# Patient Record
Sex: MALE | Race: WHITE | NOT HISPANIC OR LATINO | Employment: UNEMPLOYED | URBAN - METROPOLITAN AREA
[De-identification: names, ages, dates, MRNs, and addresses within clinical notes are randomized per-mention and may not be internally consistent; named-entity substitution may affect disease eponyms.]

---

## 2022-01-20 PROCEDURE — U0004 COV-19 TEST NON-CDC HGH THRU: HCPCS | Performed by: NURSE PRACTITIONER

## 2022-03-29 ENCOUNTER — TRANSCRIBE ORDERS (OUTPATIENT)
Dept: ADMINISTRATIVE | Facility: HOSPITAL | Age: 32
End: 2022-03-29

## 2022-03-29 DIAGNOSIS — N62 GYNECOMASTIA: Primary | ICD-10-CM

## 2022-04-05 ENCOUNTER — TRANSCRIBE ORDERS (OUTPATIENT)
Dept: ADMINISTRATIVE | Facility: HOSPITAL | Age: 32
End: 2022-04-05

## 2022-04-05 DIAGNOSIS — N62 GYNECOMASTIA: Primary | ICD-10-CM

## 2022-04-08 ENCOUNTER — OFFICE VISIT (OUTPATIENT)
Dept: OTOLARYNGOLOGY | Facility: CLINIC | Age: 32
End: 2022-04-08

## 2022-04-08 VITALS — BODY MASS INDEX: 27.49 KG/M2 | HEIGHT: 74 IN | WEIGHT: 214.2 LBS | TEMPERATURE: 97.8 F

## 2022-04-08 DIAGNOSIS — Z87.891 HISTORY OF TOBACCO USE: Primary | ICD-10-CM

## 2022-04-08 PROCEDURE — 99203 OFFICE O/P NEW LOW 30 MIN: CPT | Performed by: OTOLARYNGOLOGY

## 2022-04-08 RX ORDER — IBUPROFEN 800 MG/1
800 TABLET ORAL EVERY 6 HOURS PRN
COMMUNITY
Start: 2022-02-24 | End: 2022-11-19

## 2022-04-08 RX ORDER — PANTOPRAZOLE SODIUM 40 MG/1
40 TABLET, DELAYED RELEASE ORAL DAILY
COMMUNITY
Start: 2022-03-29 | End: 2022-11-19

## 2022-04-08 RX ORDER — HYDROCODONE BITARTRATE AND ACETAMINOPHEN 5; 325 MG/1; MG/1
1 TABLET ORAL EVERY 6 HOURS PRN
COMMUNITY
Start: 2022-02-24 | End: 2022-11-19

## 2022-04-08 RX ORDER — HYDROXYZINE HYDROCHLORIDE 25 MG/1
TABLET, FILM COATED ORAL
COMMUNITY
Start: 2022-03-29 | End: 2022-11-19

## 2022-04-08 RX ORDER — CHLORHEXIDINE GLUCONATE 0.12 MG/ML
RINSE ORAL
COMMUNITY
Start: 2022-01-19 | End: 2022-11-19

## 2022-04-08 NOTE — PROGRESS NOTES
Patient Name: Lionel Zayas   Visit Date: 04/08/2022   Patient ID: [id number]  Provider: Timmy Louie MD    Sex: male  Location: [facility]   YOB: 1990  Location Address: [address] [place] [address], [address] [place] [address],?[place]?[id number]    Primary Care Provider Samson Lacey MD  Location Phone: [phone]    Referring Provider: Samson Lacey MD        Chief Complaint  Mouth Lesions    History of Present Illness  Lionel Zayas is a 32 y.o. male who presents to [facility] today as a consult from Samson Lacey MD for evaluation of an oral lesion. He initially noticed a white spot on his uvula a few months ago.  He has since noticed areas on his tongue he is concerned about.  He is not experiencing any pain. Denies any neck masses, dysphagia, hoarseness, bleeding, night sweats, or weight loss.  He did quit smoking a few years ago and has briefly used smokeless tobacco in the past.     Past Medical History:   Diagnosis Date   • Dental disease 10/2021    Cavities/ broken tooth   • GERD (gastroesophageal reflux disease) 10/2021       Past Surgical History:   Procedure Laterality Date   • MULTIPLE TOOTH EXTRACTIONS           Current Outpatient Medications:   •  chlorhexidine (PERIDEX) 0.12 % solution, RINSE MOUTH WITH 15ml (1 capful) FOR 30 seconds IN THE MORNING AND IN THE EVENING AFTER toothbrushing. SPIT AFTER rinsing, DO not swallow, Disp: , Rfl:   •  hydrOXYzine (ATARAX) 25 MG tablet, TAKE ONE TABLET BY MOUTH AS NEEDED AT BEDTIME, Disp: , Rfl:   •  pantoprazole (PROTONIX) 40 MG EC tablet, Take 40 mg by mouth Daily., Disp: , Rfl:   •  HYDROcodone-acetaminophen (NORCO) 5-325 MG per tablet, Take 1 tablet by mouth Every 6 (Six) Hours As Needed. for pain, Disp: , Rfl:   •  ibuprofen (ADVIL,MOTRIN) 800 MG tablet, Take 800 mg by mouth Every 6 (Six) Hours As Needed. for pain, Disp: , Rfl:      No Known Allergies    Social  "History     Tobacco Use   • Smoking status: Never Smoker   • Smokeless tobacco: Former User     Quit date: 2014   Vaping Use   • Vaping Use: Never used   Substance Use Topics   • Alcohol use: Not Currently   • Drug use: Not Currently     Types: Marijuana     Comment: \"Not currently but did for years\"        Objective     Vital Signs:   Temp 97.8 °F (36.6 °C) (Temporal)   Ht 188 cm (74\")   Wt 97.2 kg (214 lb 3.2 oz)   BMI 27.50 kg/m²       Physical Exam    General: Well developed, well nourished patient of stated age in no acute distress. Voice is strong and clear.   Head: Normocephalic and atraumatic.  Face: No lesions.  Bilateral parotid and submandibular glands are unremarkable.  Stensen's and Warthin's ducts are productive of clear saliva bilaterally.  House-Brackmann I/VI     bilaterally.   muscles and temporomandibular joint nontender to palpation.  No TMJ crepitus.  Eyes: PERRLA, sclerae anicteric, no conjunctival injection. Extra ocular movements are intact and full. No nystagmus.   Ears: Auricles are normal in appearance. Bilateral external auditory canals are unremarkable. Bilateral tympanic membranes are clear and without effusion. Hearing normal to conversational voice.   Nose: External nose is normal in appearance. Bilateral nares are patent with normal appearing mucosa. Septum midline. Turbinates are unremarkable. No lesions.   Oral Cavity: Lips are normal in appearance. Oral mucosa is unremarkable. Gingiva is unremarkable. Partial dentition for age. Tongue is unremarkable with good movement. Prominent circumvallate papilla and filiform papilla posteriorly and laterally. Hard palate is unremarkable.   Oropharynx: Soft palate is unremarkable with full movement. Uvula is unremarkable aside from a small prominent minor salivary gland on the left base of the uvula. Bilateral tonsils are unremarkable. Posterior oropharynx is unremarkable.    Larynx and hypopharynx: Deferred secondary to gag " reflex.  Neck: Supple.  No mass.  Nontender to palpation.  Trachea midline. Thyroid normal size and without nodules to palpation.   Lymphatic: No lymphadenopathy upon palpation.  Respiratory: Clear to auscultation bilaterally, nonlabored respirations    Cardiovascular: RRR, no murmurs, rubs, or gallops,   Psychiatric: Appropriate affect, cooperative   Neurologic: Oriented x 3, strength symmetric in all extremities, Cranial Nerves II-XII are grossly intact to confrontation   Skin: Warm and dry. No rashes.    Procedures           Result Review :               Assessment and Plan    Diagnoses and all orders for this visit:    1. History of tobacco use (Primary)    Impressions and findings were discussed at great length.  Currently, he was reassured that there is no evidence of lesion on examination today.  We discussed that his tongue lesions are likely consistent with prominent circumvallate papilla and filiform papilla and that the small lesion at the base of the uvula on the left appears to be a prominent minor salivary gland.  Options for further evaluation management were discussed and he will continue with observation at home.  If he notices any changes he is to call to arrange follow-up.        Follow Up   Return if symptoms worsen or fail to improve.  Patient was given instructions and counseling regarding his condition or for health maintenance advice. Please see specific information pulled into the AVS if appropriate.

## 2022-05-03 ENCOUNTER — HOSPITAL ENCOUNTER (OUTPATIENT)
Dept: MAMMOGRAPHY | Facility: HOSPITAL | Age: 32
Discharge: HOME | End: 2022-05-03

## 2022-05-03 ENCOUNTER — HOSPITAL ENCOUNTER (OUTPATIENT)
Dept: ULTRASOUND IMAGING | Facility: HOSPITAL | Age: 32
Discharge: HOME | End: 2022-05-03

## 2022-05-03 DIAGNOSIS — N62 GYNECOMASTIA: ICD-10-CM

## 2022-05-03 PROCEDURE — 76642 ULTRASOUND BREAST LIMITED: CPT

## 2022-05-03 PROCEDURE — G0279 TOMOSYNTHESIS, MAMMO: HCPCS

## 2022-05-03 PROCEDURE — 77066 DX MAMMO INCL CAD BI: CPT

## 2022-05-20 PROBLEM — N62 GYNECOMASTIA: Status: ACTIVE | Noted: 2022-05-20

## 2022-05-23 ENCOUNTER — TELEPHONE (OUTPATIENT)
Dept: SURGERY | Facility: CLINIC | Age: 32
End: 2022-05-23

## 2022-05-23 RX ORDER — VALACYCLOVIR HYDROCHLORIDE 1 G/1
1000 TABLET, FILM COATED ORAL 2 TIMES DAILY
COMMUNITY
Start: 2022-04-29 | End: 2022-11-19

## 2022-05-23 NOTE — TELEPHONE ENCOUNTER
Caller: EDNA MATTHEWS 1990    Relationship: SELF     Best call back number: [phone]     What is the best time to reach you: ANYTIME     What was the call regarding:  PT CALLED TO CANCEL SAME DAY APPT 5/23 10:10 W/ NASIMA- PT IS RESCHEDULED FOR 6/9 10:40 W/ DR. DEL REAL - NO CALL BACK NEEDED     Do you require a callback: NO

## 2022-06-09 ENCOUNTER — OFFICE VISIT (OUTPATIENT)
Dept: SURGERY | Facility: CLINIC | Age: 32
End: 2022-06-09

## 2022-06-09 VITALS — RESPIRATION RATE: 15 BRPM | BODY MASS INDEX: 27.46 KG/M2 | WEIGHT: 214 LBS | HEIGHT: 74 IN

## 2022-06-09 DIAGNOSIS — N62 GYNECOMASTIA: Primary | ICD-10-CM

## 2022-06-09 PROCEDURE — 99203 OFFICE O/P NEW LOW 30 MIN: CPT | Performed by: SURGERY

## 2022-06-09 RX ORDER — KETOCONAZOLE 20 MG/ML
SHAMPOO TOPICAL
COMMUNITY
Start: 2022-05-31 | End: 2022-11-19

## 2022-06-09 NOTE — PROGRESS NOTES
Chief Complaint: Breast Mass    Subjective         History of Present Illness  Lionel Zayas is a 32 y.o. male presents to [facility] to be seen for gynecomastia.  He has had this since puberty.  He has at least a c cup breast and this is painful for him and causes a lot of emotional distress.  His imaging is shown below:    Narrative & Impression   PROCEDURE:  MAMMO DIAGNOSTIC DIGITAL TOMOSYNTHESIS BILATERAL W CAD, 5/03/2022, 7:52  US BREAST LEFT LIMITED, 5/03/2022, 7:58     COMPARISON:  [facility], US, US BREAST LEFT LIMITED, 5/03/2022, 7:58.  [facility], US, US BREAST LEFT LIMITED, 9/11/2015, 14:11.  [facility], ,   DIGITAL DIAG KIERA W/CAD, 9/11/2015, 14:31.     INDICATIONS:  32-year-old male with request for workup for bilateral gynecomastia, which he states   he has had since age 19.  He was recommended for short-term follow-up of a hyperechoic left breast   mass in 2015. He reports having longstanding left breast lump and history of lipomas.     FINDINGS:          Mammogram:  There is prominent bilateral gynecomastia.  No suspicious mass, calcifications, or   architectural distortion is seen in either breast.     Ultrasound:  No sonographic abnormality is seen in the left breast at 02:00 at the site of   previously seen hyperechoic mass in 2015. Corresponding to the patient's left breast lump at 04:00,   10 cm from the nipple, there is a 2.4 x 0.9 x 1.9 cm oval, circumscribed, hyperechoic mass, likely   a lipoma.  Incidentally noted at 03:00, 10 cm from the nipple in the left breast is a similar   appearing 2.7 x 0.8 x 1.8 cm oval, circumscribed, hyperechoic mass, also likely a lipoma.     IMPRESSION:  Prominent bilateral gynecomastia.  Recommend clinical workup and clinical management.  Consider   surgical consultation.     Corresponding to the patient's left breast lump at 04:00, 10 cm from the nipple, there is a 2.4 cm    probable lipoma.  Similar appearing 2.7 cm probable lipoma is incidentally noted in the left breast   at 03:00, 10 cm from the nipple.  Recommend clinical follow-up.     I discussed results with the patient following the exam.     He has bilateral breast masses on imaging that are possibly lipomas.       Objective     Past Medical History:   Diagnosis Date   • Colon polyp     One polyp found during colonoscopy in    • Dental disease 10/2021    Cavities/ broken tooth   • GERD (gastroesophageal reflux disease) 10/2021       Past Surgical History:   Procedure Laterality Date   • MULTIPLE TOOTH EXTRACTIONS           Current Outpatient Medications:   •  chlorhexidine (PERIDEX) 0.12 % solution, RINSE MOUTH WITH 15ml (1 capful) FOR 30 seconds IN THE MORNING AND IN THE EVENING AFTER toothbrushing. SPIT AFTER rinsing, DO not swallow, Disp: , Rfl:   •  hydrOXYzine (ATARAX) 25 MG tablet, TAKE ONE TABLET BY MOUTH AS NEEDED AT BEDTIME, Disp: , Rfl:   •  ketoconazole (NIZORAL) 2 % shampoo, apply EVERY DAY TO THE AFFECTED AREA ON THE body AND SCALP UNTIL CLEAR. let sit FOR 10-15 MINUTES BEFORE rinsing, Disp: , Rfl:   •  pantoprazole (PROTONIX) 40 MG EC tablet, Take 40 mg by mouth Daily., Disp: , Rfl:   •  HYDROcodone-acetaminophen (NORCO) 5-325 MG per tablet, Take 1 tablet by mouth Every 6 (Six) Hours As Needed. for pain, Disp: , Rfl:   •  ibuprofen (ADVIL,MOTRIN) 800 MG tablet, Take 800 mg by mouth Every 6 (Six) Hours As Needed. for pain, Disp: , Rfl:   •  valACYclovir (VALTREX) 1000 MG tablet, Take 1,000 mg by mouth 2 (Two) Times a Day., Disp: , Rfl:     No Known Allergies     History reviewed. No pertinent family history.    Social History     Socioeconomic History   • Marital status: Single   Tobacco Use   • Smoking status: Former Smoker     Packs/day: 1.00     Years: 10.00     Pack years: 10.00     Types: Cigars     Start date: 2010     Quit date: 2022     Years since quittin.3   • Smokeless tobacco:  "Former User     Quit date: 2014   Vaping Use   • Vaping Use: Never used   Substance and Sexual Activity   • Alcohol use: Not Currently   • Drug use: Not Currently     Types: Marijuana     Comment: \"Not currently but did for years\"   • Sexual activity: Yes     Partners: Female     Birth control/protection: Surgical       Vital Signs:   Resp 15   Ht 188 cm (74\")   Wt 97.1 kg (214 lb)   BMI 27.48 kg/m²    Review of Systems    Physical Exam  Vitals and nursing note reviewed.   Constitutional:       General: He is not in acute distress.     Appearance: Normal appearance. He is well-developed.   HENT:      Head: Normocephalic and atraumatic.   Eyes:      Extraocular Movements: Extraocular movements intact.      Pupils: Pupils are equal, round, and reactive to light.   Cardiovascular:      Pulses: Normal pulses.   Pulmonary:      Effort: Pulmonary effort is normal. No retractions.      Breath sounds: Normal air entry. No wheezing.   Abdominal:      General: There is no distension.      Palpations: Abdomen is soft.      Tenderness: There is no abdominal tenderness.      Hernia: No hernia is present.   Musculoskeletal:         General: No swelling or deformity.      Cervical back: Neck supple.   Skin:     General: Skin is warm and dry.      Findings: No erythema.      Comments: Bilateral breast masses and gynecomastia   Neurological:      General: No focal deficit present.      Mental Status: He is alert and oriented to person, place, and time.      Motor: Motor function is intact.   Psychiatric:         Mood and Affect: Mood normal.         Thought Content: Thought content normal.          Result Review :               Assessment and Plan    Diagnoses and all orders for this visit:    1. Gynecomastia (Primary)      Pt will call and let us know when he wants to schedule surgery... will discuss with him about referral to have plastics involved    Follow Up   Return for Next scheduled followup after surgery.  Patient was " given instructions and counseling regarding his condition or for health maintenance advice. Please see specific information pulled into the AVS if appropriate.         This document has been electronically signed by Sammi Pires MD  June 9, 2022 12:31 EDT

## 2022-06-28 ENCOUNTER — TELEPHONE (OUTPATIENT)
Dept: GASTROENTEROLOGY | Facility: CLINIC | Age: 32
End: 2022-06-28

## 2022-06-28 NOTE — TELEPHONE ENCOUNTER
Spoke to Mr. Zayas on the phone at 10:30am, he stated that he wasn't ready for the phone call. That he would call back to reschedule that his insurance was no longer valid and that he would call back after he got new insurance to make sure procedure was covered. Kelley

## 2022-11-19 ENCOUNTER — HOSPITAL ENCOUNTER (EMERGENCY)
Facility: HOSPITAL | Age: 32
Discharge: HOME | End: 2022-11-19
Attending: EMERGENCY MEDICINE | Admitting: EMERGENCY MEDICINE

## 2022-11-19 VITALS
OXYGEN SATURATION: 99 % | TEMPERATURE: 98.4 F | HEIGHT: 74 IN | SYSTOLIC BLOOD PRESSURE: 126 MMHG | HEART RATE: 88 BPM | BODY MASS INDEX: 28.24 KG/M2 | DIASTOLIC BLOOD PRESSURE: 82 MMHG | WEIGHT: 220.02 LBS | RESPIRATION RATE: 16 BRPM

## 2022-11-19 DIAGNOSIS — J02.9 PHARYNGITIS, UNSPECIFIED ETIOLOGY: Primary | ICD-10-CM

## 2022-11-19 DIAGNOSIS — K04.7 DENTAL ABSCESS: ICD-10-CM

## 2022-11-19 LAB
FLUAV AG NPH QL: NEGATIVE
FLUBV AG NPH QL IA: NEGATIVE
S PYO AG THROAT QL: NEGATIVE
SARS-COV-2 RNA PNL SPEC NAA+PROBE: NOT DETECTED

## 2022-11-19 PROCEDURE — 87804 INFLUENZA ASSAY W/OPTIC: CPT | Performed by: EMERGENCY MEDICINE

## 2022-11-19 PROCEDURE — U0004 COV-19 TEST NON-CDC HGH THRU: HCPCS | Performed by: EMERGENCY MEDICINE

## 2022-11-19 PROCEDURE — 87880 STREP A ASSAY W/OPTIC: CPT | Performed by: EMERGENCY MEDICINE

## 2022-11-19 PROCEDURE — 87081 CULTURE SCREEN ONLY: CPT | Performed by: EMERGENCY MEDICINE

## 2022-11-19 PROCEDURE — 99283 EMERGENCY DEPT VISIT LOW MDM: CPT

## 2022-11-19 PROCEDURE — C9803 HOPD COVID-19 SPEC COLLECT: HCPCS | Performed by: EMERGENCY MEDICINE

## 2022-11-19 RX ORDER — PENICILLIN V POTASSIUM 500 MG/1
500 TABLET ORAL 4 TIMES DAILY
Qty: 40 TABLET | Refills: 0 | Status: SHIPPED | OUTPATIENT
Start: 2022-11-19 | End: 2022-11-29

## 2022-11-19 NOTE — ED PROVIDER NOTES
Subjective   History of Present Illness      The patient is a 33 yo M, who presents to the ED c/o sore throat. Symptoms started approximately 10 days ago. Saw white spots that he was concerned could be tonsil stones.  Took mom's azithro. Feeling more swollen. Also had dental abscess right upper molar that drained. Continues to drain intermittently. Denies difficulty swallowing, difficulty breathing, muffled voice, drooling. No associated facial swelling, trismus, nausea, vomiting. Denies URI symptoms at this time.      Review of Systems   Constitutional: Negative for chills and fever.   HENT: Positive for dental problem and sore throat. Negative for congestion, drooling, facial swelling, sinus pressure, sinus pain, trouble swallowing and voice change.    Respiratory: Negative for cough and shortness of breath.    Cardiovascular: Negative for chest pain and palpitations.   Gastrointestinal: Negative for abdominal pain, diarrhea, nausea and vomiting.   Genitourinary: Negative for dysuria.   Neurological: Negative for headaches.   All other systems reviewed and are negative.      Past Medical History:   Diagnosis Date   • Colon polyp     One polyp found during colonoscopy in    • Dental disease 10/2021    Cavities/ broken tooth   • GERD (gastroesophageal reflux disease) 10/2021       No Known Allergies    Past Surgical History:   Procedure Laterality Date   • MULTIPLE TOOTH EXTRACTIONS         History reviewed. No pertinent family history.    Social History     Socioeconomic History   • Marital status: Single   Tobacco Use   • Smoking status: Former     Packs/day: 1.00     Years: 10.00     Pack years: 10.00     Types: Cigars, Cigarettes     Start date: 2010     Quit date: 2022     Years since quittin.8   • Smokeless tobacco: Former     Quit date:    Vaping Use   • Vaping Use: Never used   Substance and Sexual Activity   • Alcohol use: Not Currently   • Drug use: Not Currently     Types:  Marijuana     Comment: yesterday   • Sexual activity: Yes     Partners: Female     Birth control/protection: Surgical           Objective   Physical Exam  Vitals and nursing note reviewed.   Constitutional:       Appearance: Normal appearance. He is not ill-appearing or toxic-appearing.   HENT:      Head: Normocephalic.      Nose: Nose normal.      Mouth/Throat:      Mouth: Mucous membranes are moist.      Dentition: Abnormal dentition.      Pharynx: Uvula midline. Posterior oropharyngeal erythema present. No pharyngeal swelling, oropharyngeal exudate or uvula swelling.      Tonsils: No tonsillar exudate or tonsillar abscesses.      Comments: No facial swelling. No appreciable adjacent abscess. No submandibular brawny edema. No sublingual induration or pus on floor of mouth.   Uvula midline, no dysphonia or drooling  Eyes:      Conjunctiva/sclera: Conjunctivae normal.   Cardiovascular:      Rate and Rhythm: Normal rate and regular rhythm.   Pulmonary:      Effort: Pulmonary effort is normal.   Musculoskeletal:      Cervical back: Normal range of motion.   Skin:     General: Skin is warm and dry.   Neurological:      Mental Status: He is alert.         Procedures           ED Course                                           MDM     33 yo M presents to ED for sore throat x 10 days. Took azithromycin. Also has had a dental abscess that previously drained, but still intermittently drains. No red flag signs concerning for retropharyngeal abscess, peritonsillar abscess. Flu, strep negative. covid pending. Will cover dental abscess with PCN. No clinical concern for lorena's angina. Advised f/u with PCP. Return precautions discussed.     I have spoken with the patient. I have explained the patient´s condition, diagnoses and treatment plan based on the information available to me at this time. I have answered the pt questions and addressed any concerns. The patient has a good  understanding of the patient´s diagnosis,  condition, and treatment plan as can be expected at this point. The vital signs have been stable. The patient´s condition is stable and appropriate for discharge from the emergency department.      The patient will pursue further outpatient evaluation with the primary care physician or other designated or consulting physician as outlined in the discharge instructions. They are agreeable to this plan of care and follow-up instructions have been explained in detail. The patient has received these instructions in written format and have expressed an understanding of the discharge instructions. The patient is aware that any significant change in condition or worsening of symptoms should prompt an immediate return to this or the closest emergency department or call to 911.   Labs Reviewed   RAPID STREP A SCREEN - Normal   INFLUENZA ANTIGEN, RAPID - Normal   COVID-19,APTIMA PANTHER (RENE)BH ELLY/[facility] BEAN, NP/OP SWAB IN UTM/VTM/[facility] [employer]SPORT MEDIA,24 HR TAT - Normal    Narrative:     Fact sheet for providers: https://www.fda.gov/media/726311/download     Fact sheet for patients: https://www.fda.gov/media/685025/download    Test performed by RT PCR.   BETA HEMOLYTIC STREP CULTURE, THROAT - Normal    Narrative:     Group A Strep incidence is low in adults. Positive culture for Beta hemolytic Streptococcus species can reflect colonization and not true infection. Please correlate clinically.          Final diagnoses:   Pharyngitis, unspecified etiology   Dental abscess       ED Disposition  ED Disposition     ED Disposition   Discharge    Condition   Stable    Comment   --             Saira Blackwell, APRN  [address] [facility] [address]  [phone]          [facility] EMERGENCY ROOM  [address] [address] [phone]    If symptoms worsen         Medication List      New Prescriptions    penicillin v potassium 500 MG tablet  Commonly known as: VEETID  Take 1  tablet by mouth 4 (Four) Times a Day for 10 days.           Where to Get Your Medications      These medications were sent to [facility] [place], [place] - [address] - [phone]  - [phone]   [address] [facility] [address]    Phone: [phone]   · penicillin v potassium 500 MG tablet          Moris Currie PA-C  11/21/22 [id number]

## 2022-11-21 LAB — BACTERIA SPEC AEROBE CULT: NORMAL

## 2023-03-17 PROCEDURE — U0004 COV-19 TEST NON-CDC HGH THRU: HCPCS | Performed by: PHYSICIAN ASSISTANT

## 2023-04-03 ENCOUNTER — HOSPITAL ENCOUNTER (EMERGENCY)
Facility: HOSPITAL | Age: 33
Discharge: HOME | End: 2023-04-03
Attending: EMERGENCY MEDICINE | Admitting: EMERGENCY MEDICINE
Payer: MEDICAID

## 2023-04-03 VITALS
TEMPERATURE: 100.2 F | BODY MASS INDEX: 28.49 KG/M2 | HEIGHT: 74 IN | RESPIRATION RATE: 18 BRPM | HEART RATE: 105 BPM | DIASTOLIC BLOOD PRESSURE: 89 MMHG | WEIGHT: 222 LBS | OXYGEN SATURATION: 96 % | SYSTOLIC BLOOD PRESSURE: 129 MMHG

## 2023-04-03 DIAGNOSIS — J02.0 STREP PHARYNGITIS: Primary | ICD-10-CM

## 2023-04-03 DIAGNOSIS — R50.9 FEVER, UNSPECIFIED: ICD-10-CM

## 2023-04-03 LAB — S PYO AG THROAT QL: NEGATIVE

## 2023-04-03 PROCEDURE — 25010000002 DEXAMETHASONE SODIUM PHOSPHATE 10 MG/ML SOLUTION: Performed by: NURSE PRACTITIONER

## 2023-04-03 PROCEDURE — 87081 CULTURE SCREEN ONLY: CPT | Performed by: EMERGENCY MEDICINE

## 2023-04-03 PROCEDURE — 25010000002 KETOROLAC TROMETHAMINE PER 15 MG: Performed by: NURSE PRACTITIONER

## 2023-04-03 PROCEDURE — 87880 STREP A ASSAY W/OPTIC: CPT | Performed by: EMERGENCY MEDICINE

## 2023-04-03 PROCEDURE — 99283 EMERGENCY DEPT VISIT LOW MDM: CPT

## 2023-04-03 PROCEDURE — 96372 THER/PROPH/DIAG INJ SC/IM: CPT

## 2023-04-03 RX ORDER — DEXAMETHASONE SODIUM PHOSPHATE 10 MG/ML
10 INJECTION, SOLUTION INTRAMUSCULAR; INTRAVENOUS ONCE
Status: COMPLETED | OUTPATIENT
Start: 2023-04-03 | End: 2023-04-03

## 2023-04-03 RX ORDER — KETOROLAC TROMETHAMINE 30 MG/ML
30 INJECTION, SOLUTION INTRAMUSCULAR; INTRAVENOUS ONCE
Status: COMPLETED | OUTPATIENT
Start: 2023-04-03 | End: 2023-04-03

## 2023-04-03 RX ORDER — AZITHROMYCIN 500 MG/1
500 TABLET, FILM COATED ORAL DAILY
Qty: 5 TABLET | Refills: 0 | Status: SHIPPED | OUTPATIENT
Start: 2023-04-03

## 2023-04-03 RX ADMIN — KETOROLAC TROMETHAMINE 30 MG: 30 INJECTION, SOLUTION INTRAMUSCULAR; INTRAVENOUS at 12:33

## 2023-04-03 RX ADMIN — DEXAMETHASONE SODIUM PHOSPHATE 10 MG: 10 INJECTION INTRAMUSCULAR; INTRAVENOUS at 12:33

## 2023-04-03 NOTE — Clinical Note
[facility] EMERGENCY ROOM  [address]  [place] [address]  Phone: [phone]    Lionel Zayas was seen and treated in our emergency department on 4/3/2023.  He may return to work on 04/06/2023.         Thank you for choosing [facility].    Sparkle Disla, LACHELLE

## 2023-04-03 NOTE — DISCHARGE INSTRUCTIONS
Even though your rapid strep test was negative today, I believe you do have strep.  I am going to go ahead and treat you for this.  Please make sure you take your antibiotics until they are gone.  You may want to take medication with food if it upsets your stomach.  Drink plenty of fluids and stay well-hydrated.  Alternate Tylenol and ibuprofen every 4-6 hours as needed for pain or fever.  Return to the ER with any new or worsening symptoms.

## 2023-04-03 NOTE — ED PROVIDER NOTES
Time: 12:20 PM EDT  Date of encounter:  4/3/2023  Independent Historian/Clinical History and Information was obtained by:   Patient  Chief Complaint: Sore throat    History is limited by: N/A    History of Present Illness:  Patient is a 33 y.o. year old male who presents to the emergency department for evaluation of sore throat since yesterday. Pt reports being able to feel tonsil on the side. Pt reports having a fever and has been taking OTC. Pt reports his kids having strep throat. Pt denies cough and congestion. Pt confirms ear pain and dysphagia. Pt reports slight nausea.    HPI    Patient Care Team  Primary Care Provider: Provider, No Known    Past Medical History:     No Known Allergies  Past Medical History:   Diagnosis Date   • Colon polyp     One polyp found during colonoscopy in    • Dental disease 10/2021    Cavities/ broken tooth   • GERD (gastroesophageal reflux disease) 10/2021     Past Surgical History:   Procedure Laterality Date   • MULTIPLE TOOTH EXTRACTIONS       History reviewed. No pertinent family history.    Home Medications:  Prior to Admission medications    Not on File        Social History:   Social History     Tobacco Use   • Smoking status: Former     Packs/day: 1.00     Years: 10.00     Pack years: 10.00     Types: Cigars, Cigarettes     Start date: 2010     Quit date: 2022     Years since quittin.1   • Smokeless tobacco: Former     Quit date:    Vaping Use   • Vaping Use: Never used   Substance Use Topics   • Alcohol use: Not Currently   • Drug use: Not Currently     Types: Marijuana     Comment: yesterday         Review of Systems:  Review of Systems   Constitutional: Positive for fever. Negative for chills and fatigue.   HENT: Positive for ear pain and sore throat. Negative for congestion and rhinorrhea.         Dysphagia   Eyes: Negative.    Respiratory: Negative for cough, chest tightness and shortness of breath.    Cardiovascular: Negative for chest pain  "and palpitations.   Gastrointestinal: Positive for nausea. Negative for abdominal pain, diarrhea and vomiting.   Endocrine: Negative.    Genitourinary: Negative for decreased urine volume, difficulty urinating, flank pain, frequency, hematuria and urgency.   Musculoskeletal: Negative for arthralgias and myalgias.   Skin: Negative for color change, rash and wound.   Allergic/Immunologic: Negative.    Neurological: Negative for dizziness, syncope, weakness, light-headedness and headaches.   Hematological: Negative.    Psychiatric/Behavioral: Negative for agitation and confusion. The patient is not nervous/anxious.         Physical Exam:  /89   Pulse 105   Temp 100.2 °F (37.9 °C) (Oral)   Resp 18   Ht 188 cm (74\")   Wt 101 kg (222 lb)   SpO2 96%   BMI 28.50 kg/m²     Physical Exam  Vitals and nursing note reviewed.   Constitutional:       General: He is not in acute distress.     Appearance: Normal appearance. He is not ill-appearing.   HENT:      Head: Normocephalic and atraumatic.      Comments: Diaphoretic forehead, posterior aryngal edema, erythema, tonsilar swelling 2-3+ bilaterally, exudate on tonsils on R, bilaterally cervical adenopathy, ears nl     Right Ear: Tympanic membrane and ear canal normal.      Left Ear: Tympanic membrane and ear canal normal.      Ears:      Comments: Normal exam     Nose: Nose normal.      Mouth/Throat:      Pharynx: Pharyngeal swelling and posterior oropharyngeal erythema present.      Tonsils: Tonsillar exudate (R side) present.      Comments: tonsillar swelling 2-3+ bilaterally, erythema, posterior  Eyes:      Extraocular Movements: Extraocular movements intact.      Pupils: Pupils are equal, round, and reactive to light.   Cardiovascular:      Rate and Rhythm: Normal rate and regular rhythm.      Pulses: Normal pulses.      Heart sounds: Normal heart sounds. No murmur heard.    No gallop.   Pulmonary:      Effort: Pulmonary effort is normal. No respiratory distress. "      Breath sounds: Normal breath sounds. No wheezing, rhonchi or rales.   Chest:      Chest wall: No tenderness.   Abdominal:      General: Bowel sounds are normal. There is no distension.      Palpations: Abdomen is soft.      Tenderness: There is no abdominal tenderness.   Musculoskeletal:         General: No tenderness. Normal range of motion.      Cervical back: Normal range of motion and neck supple.   Lymphadenopathy:      Cervical: Cervical adenopathy (bilateral anterior) present.   Skin:     General: Skin is warm and dry.      Findings: No erythema or rash.      Comments: Diaphoretic forehead   Neurological:      Mental Status: He is alert and oriented to person, place, and time.      Motor: No weakness.   Psychiatric:         Mood and Affect: Mood normal.         Behavior: Behavior normal.                  Procedures:  Procedures      Medical Decision Making:      Comorbidities that affect care:    GERD    External Notes reviewed:    Previous Clinic Note: patient seen at [facility] Mar 17 for complaints of pharyngitis      The following orders were placed and all results were independently analyzed by me:  Orders Placed This Encounter   Procedures   • Rapid Strep A Screen - Swab, Throat   • Beta Strep Culture, Throat - Swab, Throat       Medications Given in the Emergency Department:  Medications   ketorolac (TORADOL) injection 30 mg (30 mg Intramuscular Given 4/3/23 1233)   dexamethasone sodium phosphate injection 10 mg (10 mg Intramuscular Given 4/3/23 1233)        ED Course:         Labs:    Lab Results (last 24 hours)     ** No results found for the last 24 hours. **           Imaging:    No Radiology Exams Resulted Within Past 24 Hours      Differential Diagnosis and Discussion:    Sore Throat: Differential diagnosis includes but is not limited to bacterial infection, viral infection, inhaled irritants, sinus drainage, thyroiditis, epiglottitis, and retropharyngeal abscess.    All labs were reviewed and  interpreted by me.    MDM  Number of Diagnoses or Management Options  Fever, unspecified  Strep pharyngitis  Diagnosis management comments: Based on centor criteria, even though the rapid strep is negative, I'm still going to treat for strep pharyngitis. Patient appears moderately ill. Temp as noted above. Exudative pharyngo-tonsillitis is noted. Anterior cervical nodes are present.  Ears are normal, chest is clear.  Rapid strep test is negative. No rashes. No hepatosplenomegaly.       Amount and/or Complexity of Data Reviewed  Clinical lab tests: ordered and reviewed  Review and summarize past medical records: yes    Risk of Complications, Morbidity, and/or Mortality  Presenting problems: moderate  Diagnostic procedures: moderate  Management options: moderate    Patient Progress  Patient progress: stable           Patient Care Considerations:    NARCOTICS: I considered prescribing opiate pain medication as an outpatient, however patient was treated with NSAIDS and pain was tolerable. Narcotics not indicated at this time      Consultants/Shared Management Plan:    shared management with attending physician    Social Determinants of Health:    Patient is independent, reliable, and has access to care.       Disposition and Care Coordination:    Discharged: The patient is suitable and stable for discharge with no need for consideration of observation or admission.    I have explained discharge medications and the need for follow up with the patient/caretakers. This was also printed in the discharge instructions. Patient was discharged with the following medications and follow up:      Medication List      New Prescriptions    azithromycin 500 MG tablet  Commonly known as: ZITHROMAX  Take 1 tablet by mouth Daily.           Where to Get Your Medications      These medications were sent to [facility] - [place], [place] - [address] - [phone]  - [phone]  [address]  [address]    Phone: [phone]   · azithromycin 500 MG tablet      Provider, No Known  [facility]  [place] [address]  [phone]      find a PCP to follow up with if not better    [facility] EMERGENCY ROOM  [address] [address] [phone]  Go to   As needed, If symptoms worsen       Final diagnoses:   Strep pharyngitis   Fever, unspecified        ED Disposition     ED Disposition   Discharge    Condition   Stable    Comment   --             This medical record created using voice recognition software.        Documentation assistance provided by Joel Kothari acting as scribe for LACHELLE Bueno. Information recorded by the scribe was done at my direction and has been verified and validated by me.          Joel Kothari  04/03/23 1242       Sparkle Disla APRN  04/08/23 4468

## 2023-04-03 NOTE — Clinical Note
[facility] EMERGENCY ROOM  [address]  [place] [address]  Phone: [phone]    Lionel Zayas was seen and treated in our emergency department on 4/3/2023.  He may return to work on 04/06/2023.         Thank you for choosing [facility].    Sparkle Disla, LACHELLE

## 2023-04-05 LAB — BACTERIA SPEC AEROBE CULT: ABNORMAL

## 2023-05-15 ENCOUNTER — CLINICAL SUPPORT (OUTPATIENT)
Dept: GASTROENTEROLOGY | Facility: CLINIC | Age: 33
End: 2023-05-15
Payer: MEDICAID

## 2023-05-15 ENCOUNTER — PREP FOR SURGERY (OUTPATIENT)
Dept: OTHER | Facility: HOSPITAL | Age: 33
End: 2023-05-15
Payer: MEDICAID

## 2023-05-15 DIAGNOSIS — Z86.010 HISTORY OF COLON POLYPS: ICD-10-CM

## 2023-05-15 DIAGNOSIS — K21.9 GASTROESOPHAGEAL REFLUX DISEASE, UNSPECIFIED WHETHER ESOPHAGITIS PRESENT: Primary | ICD-10-CM

## 2023-05-15 DIAGNOSIS — Z12.11 ENCOUNTER FOR SCREENING FOR MALIGNANT NEOPLASM OF COLON: ICD-10-CM

## 2023-05-15 PROBLEM — Z86.0100 HISTORY OF COLON POLYPS: Status: ACTIVE | Noted: 2023-05-15

## 2023-05-15 RX ORDER — SODIUM PICOSULFATE, MAGNESIUM OXIDE, AND ANHYDROUS CITRIC ACID 12; 3.5; 1 G/175ML; G/175ML; MG/175ML
175 LIQUID ORAL TAKE AS DIRECTED
Qty: 350 ML | Refills: 0 | Status: SHIPPED | OUTPATIENT
Start: 2023-05-15

## 2023-05-15 NOTE — PROGRESS NOTES
Lionel Zayas  1990  33 y.o.    Reason for call: Recall- 5 yr recall, hx colon polyps/last colon 2015- [facility] GERD- had previous EGD in   Prep prescribed: Clenpiq  Prep instructions reviewed with patient and sent to patient via [employer]  Clearance needed? No  If yes, what clearance is needed? N/A  Clearance has been requested from N/A  The patient has been scheduled for: EGD & Colonoscopy  Family history of colon cancer? No  If yes, indicate relative: N/A  Family History   Problem Relation Age of Onset   • Colon cancer Neg Hx      Past Medical History:   Diagnosis Date   • Colon polyp     One polyp found during colonoscopy in    • Dental disease 10/2021    Cavities/ broken tooth   • GERD (gastroesophageal reflux disease) 10/2021     No Known Allergies  Past Surgical History:   Procedure Laterality Date   • COLONOSCOPY     • MULTIPLE TOOTH EXTRACTIONS       Social History     Socioeconomic History   • Marital status: Single   Tobacco Use   • Smoking status: Former     Packs/day: 1.00     Years: 10.00     Pack years: 10.00     Types: Cigars, Cigarettes     Start date: 2010     Quit date: 2022     Years since quittin.2     Passive exposure: Current   • Smokeless tobacco: Former     Quit date:    Vaping Use   • Vaping Use: Never used   Substance and Sexual Activity   • Alcohol use: Not Currently   • Drug use: Not Currently     Types: Marijuana     Comment: yesterday   • Sexual activity: Yes     Partners: Female     Birth control/protection: Surgical       Current Outpatient Medications:   •  azithromycin (ZITHROMAX) 500 MG tablet, Take 1 tablet by mouth Daily. (Patient not taking: Reported on 5/15/2023), Disp: 5 tablet, Rfl: 0

## 2023-08-09 ENCOUNTER — ANESTHESIA (OUTPATIENT)
Dept: GASTROENTEROLOGY | Facility: HOSPITAL | Age: 33
End: 2023-08-09
Payer: MEDICAID

## 2023-08-09 ENCOUNTER — ANESTHESIA EVENT (OUTPATIENT)
Dept: GASTROENTEROLOGY | Facility: HOSPITAL | Age: 33
End: 2023-08-09
Payer: MEDICAID

## 2023-08-09 ENCOUNTER — HOSPITAL ENCOUNTER (OUTPATIENT)
Facility: HOSPITAL | Age: 33
Setting detail: HOSPITAL OUTPATIENT SURGERY
Discharge: HOME | End: 2023-08-09
Attending: INTERNAL MEDICINE | Admitting: INTERNAL MEDICINE
Payer: MEDICAID

## 2023-08-09 VITALS
HEIGHT: 74 IN | TEMPERATURE: 97.6 F | SYSTOLIC BLOOD PRESSURE: 118 MMHG | OXYGEN SATURATION: 97 % | RESPIRATION RATE: 22 BRPM | HEART RATE: 68 BPM | DIASTOLIC BLOOD PRESSURE: 86 MMHG | WEIGHT: 218.26 LBS | BODY MASS INDEX: 28.01 KG/M2

## 2023-08-09 DIAGNOSIS — Z12.11 ENCOUNTER FOR SCREENING FOR MALIGNANT NEOPLASM OF COLON: ICD-10-CM

## 2023-08-09 DIAGNOSIS — Z86.010 HISTORY OF COLON POLYPS: ICD-10-CM

## 2023-08-09 DIAGNOSIS — K21.9 GASTROESOPHAGEAL REFLUX DISEASE, UNSPECIFIED WHETHER ESOPHAGITIS PRESENT: ICD-10-CM

## 2023-08-09 PROCEDURE — 45378 DIAGNOSTIC COLONOSCOPY: CPT | Performed by: INTERNAL MEDICINE

## 2023-08-09 PROCEDURE — 43239 EGD BIOPSY SINGLE/MULTIPLE: CPT | Performed by: INTERNAL MEDICINE

## 2023-08-09 PROCEDURE — 88305 TISSUE EXAM BY PATHOLOGIST: CPT | Performed by: INTERNAL MEDICINE

## 2023-08-09 PROCEDURE — 25010000002 PROPOFOL 10 MG/ML EMULSION: Performed by: NURSE ANESTHETIST, CERTIFIED REGISTERED

## 2023-08-09 RX ORDER — SODIUM CHLORIDE, SODIUM LACTATE, POTASSIUM CHLORIDE, CALCIUM CHLORIDE 600; 310; 30; 20 MG/100ML; MG/100ML; MG/100ML; MG/100ML
30 INJECTION, SOLUTION INTRAVENOUS CONTINUOUS
Status: DISCONTINUED | OUTPATIENT
Start: 2023-08-09 | End: 2023-08-09 | Stop reason: HOSPADM

## 2023-08-09 RX ORDER — SODIUM CHLORIDE, SODIUM LACTATE, POTASSIUM CHLORIDE, CALCIUM CHLORIDE 600; 310; 30; 20 MG/100ML; MG/100ML; MG/100ML; MG/100ML
30 INJECTION, SOLUTION INTRAVENOUS CONTINUOUS
Status: DISCONTINUED | OUTPATIENT
Start: 2023-08-09 | End: 2023-08-09

## 2023-08-09 RX ORDER — PROPOFOL 10 MG/ML
VIAL (ML) INTRAVENOUS AS NEEDED
Status: DISCONTINUED | OUTPATIENT
Start: 2023-08-09 | End: 2023-08-09 | Stop reason: SURG

## 2023-08-09 RX ORDER — LIDOCAINE HYDROCHLORIDE 20 MG/ML
INJECTION, SOLUTION EPIDURAL; INFILTRATION; INTRACAUDAL; PERINEURAL AS NEEDED
Status: DISCONTINUED | OUTPATIENT
Start: 2023-08-09 | End: 2023-08-09 | Stop reason: SURG

## 2023-08-09 RX ORDER — ESOMEPRAZOLE MAGNESIUM 40 MG/1
40 CAPSULE, DELAYED RELEASE ORAL DAILY
Qty: 90 CAPSULE | Refills: 3 | Status: SHIPPED | OUTPATIENT
Start: 2023-08-09

## 2023-08-09 RX ADMIN — SODIUM CHLORIDE, POTASSIUM CHLORIDE, SODIUM LACTATE AND CALCIUM CHLORIDE: 600; 310; 30; 20 INJECTION, SOLUTION INTRAVENOUS at 10:00

## 2023-08-09 RX ADMIN — SODIUM CHLORIDE, POTASSIUM CHLORIDE, SODIUM LACTATE AND CALCIUM CHLORIDE 30 ML/HR: 600; 310; 30; 20 INJECTION, SOLUTION INTRAVENOUS at 08:53

## 2023-08-09 RX ADMIN — LIDOCAINE HYDROCHLORIDE 40 MG: 20 INJECTION, SOLUTION EPIDURAL; INFILTRATION; INTRACAUDAL; PERINEURAL at 10:03

## 2023-08-09 RX ADMIN — PROPOFOL 100 MG: 10 INJECTION, EMULSION INTRAVENOUS at 10:03

## 2023-08-09 RX ADMIN — PROPOFOL 250 MCG/KG/MIN: 10 INJECTION, EMULSION INTRAVENOUS at 10:03

## 2023-08-09 NOTE — ANESTHESIA POSTPROCEDURE EVALUATION
Patient: Lionel Zayas    Procedure Summary       Date: 08/09/23 Room / Location: [facility] ENDOSCOPY 2 / [facility] ENDOSCOPY    Anesthesia Start: 1000 Anesthesia Stop: 1029    Procedures:       ESOPHAGOGASTRODUODENOSCOPY WITH BIOPSIES      COLONOSCOPY Diagnosis:       Gastroesophageal reflux disease, unspecified whether esophagitis present      Encounter for screening for malignant neoplasm of colon      History of colon polyps      (Gastroesophageal reflux disease, unspecified whether esophagitis present [K21.9])      (Encounter for screening for malignant neoplasm of colon [Z12.11])      (History of colon polyps [Z86.010])    Surgeons: Milton Hayward MD Provider: Marcio Blandon MD    Anesthesia Type: general ASA Status: 2            Anesthesia Type: general    Vitals  Vitals Value Taken Time   BP 89/55 08/09/23 1027   Temp     Pulse 85 08/09/23 1029   Resp     SpO2 98 % 08/09/23 1029   Vitals shown include unvalidated device data.        Post Anesthesia Care and Evaluation    Patient location during evaluation: bedside  Patient participation: complete - patient participated  Level of consciousness: awake  Pain management: adequate    Airway patency: patent  PONV Status: none  Cardiovascular status: acceptable and stable  Respiratory status: acceptable  Hydration status: acceptable    Comments: An Anesthesiologist personally participated in the most demanding procedures (including induction and emergence if applicable) in the anesthesia plan, monitored the course of anesthesia administration at frequent intervals and remained physically present and available for immediate diagnosis and treatment of emergencies.

## 2023-08-09 NOTE — H&P
"ScreeningPre Procedure History & Physical    Chief Complaint:   Screening   gerd    Subjective     HPI:   Screening     Past Medical History:   Past Medical History:   Diagnosis Date    Colon polyp 2015    One polyp found during colonoscopy in 2015    Dental disease 10/2021    Cavities/ broken tooth    GERD (gastroesophageal reflux disease) 10/2021       Past Surgical History:  Past Surgical History:   Procedure Laterality Date    COLONOSCOPY      MULTIPLE TOOTH EXTRACTIONS         Family History:  Family History   Problem Relation Age of Onset    Colon cancer Neg Hx        Social History:   reports that he quit smoking about 18 months ago. His smoking use included cigars and cigarettes. He started smoking about 13 years ago. He has a 10.00 pack-year smoking history. He has been exposed to tobacco smoke. He quit smokeless tobacco use about 9 years ago. He reports that he does not currently use alcohol. He reports current drug use. Drug: Marijuana.    Medications:   No medications prior to admission.       Allergies:  Patient has no known allergies.        Objective     Blood pressure 126/86, pulse 83, temperature 97.9 øF (36.6 øC), temperature source Temporal, resp. rate 18, height 188 cm (74\"), weight 99 kg (218 lb 4.1 oz), SpO2 95 %.    Physical Exam   Constitutional: Pt is oriented to person, place, and time and well-developed, well-nourished, and in no distress.   Mouth/Throat: Oropharynx is clear and moist.   Neck: Normal range of motion.   Cardiovascular: Normal rate, regular rhythm and normal heart sounds.    Pulmonary/Chest: Effort normal and breath sounds normal.   Abdominal: Soft. Nontender  Skin: Skin is warm and dry.   Psychiatric: Mood, memory, affect and judgment normal.     Assessment & Plan     Diagnosis:  Screening colonoscopy  H/o colon polyps   ged    Anticipated Surgical Procedure:  colonoscopy    Egd    The risks, benefits, and alternatives of this procedure have been discussed with the patient " or the responsible party- the patient understands and agrees to proceed.

## 2023-08-09 NOTE — ANESTHESIA PREPROCEDURE EVALUATION
Anesthesia Evaluation     Patient summary reviewed and Nursing notes reviewed   no history of anesthetic complications:   NPO Solid Status: > 8 hours  NPO Liquid Status: > 2 hours           Airway   Mallampati: I  TM distance: >3 FB  Neck ROM: full  No difficulty expected  Dental      Pulmonary - negative pulmonary ROS and normal exam    breath sounds clear to auscultation  Cardiovascular - negative cardio ROS and normal exam  Exercise tolerance: good (4-7 METS)    Rhythm: regular  Rate: normal        Neuro/Psych- negative ROS  GI/Hepatic/Renal/Endo    (+) GERD    Musculoskeletal (-) negative ROS    Abdominal    Substance History - negative use     OB/GYN negative ob/gyn ROS         Other - negative ROS       ROS/Med Hx Other: PAT Nursing Notes unavailable.                   Anesthesia Plan    ASA 2     general     (Total IV Anesthesia    Patient understands anesthesia not responsible for dental damage.  )  intravenous induction     Anesthetic plan, risks, benefits, and alternatives have been provided, discussed and informed consent has been obtained with: patient.    Plan discussed with CRNA.      CODE STATUS:

## 2023-08-10 LAB
CYTO UR: NORMAL
LAB AP CASE REPORT: NORMAL
LAB AP CLINICAL INFORMATION: NORMAL
PATH REPORT.FINAL DX SPEC: NORMAL
PATH REPORT.GROSS SPEC: NORMAL

## 2024-10-24 ENCOUNTER — LAB (OUTPATIENT)
Dept: LAB | Facility: HOSPITAL | Age: 34
End: 2024-10-24
Payer: COMMERCIAL

## 2024-10-24 ENCOUNTER — OFFICE VISIT (OUTPATIENT)
Dept: FAMILY MEDICINE CLINIC | Facility: CLINIC | Age: 34
End: 2024-10-24
Payer: COMMERCIAL

## 2024-10-24 VITALS
BODY MASS INDEX: 26.8 KG/M2 | TEMPERATURE: 97.8 F | DIASTOLIC BLOOD PRESSURE: 96 MMHG | RESPIRATION RATE: 19 BRPM | OXYGEN SATURATION: 99 % | HEIGHT: 74 IN | WEIGHT: 208.8 LBS | SYSTOLIC BLOOD PRESSURE: 147 MMHG | HEART RATE: 79 BPM

## 2024-10-24 DIAGNOSIS — F41.1 GAD (GENERALIZED ANXIETY DISORDER): ICD-10-CM

## 2024-10-24 DIAGNOSIS — J30.2 SEASONAL ALLERGIES: ICD-10-CM

## 2024-10-24 DIAGNOSIS — Z00.00 ANNUAL PHYSICAL EXAM: ICD-10-CM

## 2024-10-24 DIAGNOSIS — Z11.59 NEED FOR HEPATITIS C SCREENING TEST: ICD-10-CM

## 2024-10-24 DIAGNOSIS — Z13.220 SCREENING FOR HYPERLIPIDEMIA: ICD-10-CM

## 2024-10-24 DIAGNOSIS — B35.6 JOCK ITCH: ICD-10-CM

## 2024-10-24 DIAGNOSIS — N62 GYNECOMASTIA: Chronic | ICD-10-CM

## 2024-10-24 DIAGNOSIS — J02.9 SORE THROAT: ICD-10-CM

## 2024-10-24 DIAGNOSIS — Z00.00 ANNUAL PHYSICAL EXAM: Primary | ICD-10-CM

## 2024-10-24 PROBLEM — K21.9 GASTROESOPHAGEAL REFLUX DISEASE: Chronic | Status: ACTIVE | Noted: 2023-05-15

## 2024-10-24 LAB
ALBUMIN SERPL-MCNC: 4.5 G/DL (ref 3.5–5.2)
ALBUMIN/GLOB SERPL: 1.6 G/DL
ALP SERPL-CCNC: 106 U/L (ref 39–117)
ALT SERPL W P-5'-P-CCNC: 16 U/L (ref 1–41)
ANION GAP SERPL CALCULATED.3IONS-SCNC: 14 MMOL/L (ref 5–15)
AST SERPL-CCNC: 13 U/L (ref 1–40)
BACTERIA UR QL AUTO: NORMAL /HPF
BASOPHILS # BLD AUTO: 0.05 10*3/MM3 (ref 0–0.2)
BASOPHILS NFR BLD AUTO: 0.6 % (ref 0–1.5)
BILIRUB SERPL-MCNC: 0.6 MG/DL (ref 0–1.2)
BILIRUB UR QL STRIP: NEGATIVE
BUN SERPL-MCNC: 7 MG/DL (ref 6–20)
BUN/CREAT SERPL: 7.3 (ref 7–25)
CALCIUM SPEC-SCNC: 9.5 MG/DL (ref 8.6–10.5)
CHLORIDE SERPL-SCNC: 106 MMOL/L (ref 98–107)
CHOLEST SERPL-MCNC: 163 MG/DL (ref 0–200)
CLARITY UR: CLEAR
CO2 SERPL-SCNC: 22 MMOL/L (ref 22–29)
COLOR UR: YELLOW
CREAT SERPL-MCNC: 0.96 MG/DL (ref 0.76–1.27)
DEPRECATED RDW RBC AUTO: 38.2 FL (ref 37–54)
EGFRCR SERPLBLD CKD-EPI 2021: 107 ML/MIN/1.73
EOSINOPHIL # BLD AUTO: 0.04 10*3/MM3 (ref 0–0.4)
EOSINOPHIL NFR BLD AUTO: 0.4 % (ref 0.3–6.2)
ERYTHROCYTE [DISTWIDTH] IN BLOOD BY AUTOMATED COUNT: 11.7 % (ref 12.3–15.4)
GLOBULIN UR ELPH-MCNC: 2.8 GM/DL
GLUCOSE SERPL-MCNC: 92 MG/DL (ref 65–99)
GLUCOSE UR STRIP-MCNC: NEGATIVE MG/DL
HCT VFR BLD AUTO: 50.4 % (ref 37.5–51)
HCV AB SER QL: NORMAL
HDLC SERPL-MCNC: 25 MG/DL (ref 40–60)
HGB BLD-MCNC: 17.1 G/DL (ref 13–17.7)
HGB UR QL STRIP.AUTO: NEGATIVE
HYALINE CASTS UR QL AUTO: NORMAL /LPF
IMM GRANULOCYTES # BLD AUTO: 0.02 10*3/MM3 (ref 0–0.05)
IMM GRANULOCYTES NFR BLD AUTO: 0.2 % (ref 0–0.5)
KETONES UR QL STRIP: NEGATIVE
LDLC SERPL CALC-MCNC: 122 MG/DL (ref 0–100)
LDLC/HDLC SERPL: 4.86 {RATIO}
LEUKOCYTE ESTERASE UR QL STRIP.AUTO: NEGATIVE
LYMPHOCYTES # BLD AUTO: 1.82 10*3/MM3 (ref 0.7–3.1)
LYMPHOCYTES NFR BLD AUTO: 20.1 % (ref 19.6–45.3)
MCH RBC QN AUTO: 30 PG (ref 26.6–33)
MCHC RBC AUTO-ENTMCNC: 33.9 G/DL (ref 31.5–35.7)
MCV RBC AUTO: 88.4 FL (ref 79–97)
MONOCYTES # BLD AUTO: 0.56 10*3/MM3 (ref 0.1–0.9)
MONOCYTES NFR BLD AUTO: 6.2 % (ref 5–12)
NEUTROPHILS NFR BLD AUTO: 6.55 10*3/MM3 (ref 1.7–7)
NEUTROPHILS NFR BLD AUTO: 72.5 % (ref 42.7–76)
NITRITE UR QL STRIP: NEGATIVE
NRBC BLD AUTO-RTO: 0 /100 WBC (ref 0–0.2)
PH UR STRIP.AUTO: 7 [PH] (ref 5–8)
PLATELET # BLD AUTO: 295 10*3/MM3 (ref 140–450)
PMV BLD AUTO: 10 FL (ref 6–12)
POTASSIUM SERPL-SCNC: 4 MMOL/L (ref 3.5–5.2)
PROLACTIN SERPL-MCNC: 15.2 NG/ML (ref 4.04–15.2)
PROT SERPL-MCNC: 7.3 G/DL (ref 6–8.5)
PROT UR QL STRIP: NEGATIVE
RBC # BLD AUTO: 5.7 10*6/MM3 (ref 4.14–5.8)
RBC # UR STRIP: NORMAL /HPF
REF LAB TEST METHOD: NORMAL
SODIUM SERPL-SCNC: 142 MMOL/L (ref 136–145)
SP GR UR STRIP: 1.01 (ref 1–1.03)
SQUAMOUS #/AREA URNS HPF: NORMAL /HPF
T4 FREE SERPL-MCNC: 1.33 NG/DL (ref 0.92–1.68)
TRIGL SERPL-MCNC: 82 MG/DL (ref 0–150)
TSH SERPL DL<=0.05 MIU/L-ACNC: 1.16 UIU/ML (ref 0.27–4.2)
UROBILINOGEN UR QL STRIP: NORMAL
VLDLC SERPL-MCNC: 16 MG/DL (ref 5–40)
WBC # UR STRIP: NORMAL /HPF
WBC NRBC COR # BLD AUTO: 9.04 10*3/MM3 (ref 3.4–10.8)

## 2024-10-24 PROCEDURE — 1160F RVW MEDS BY RX/DR IN RCRD: CPT | Performed by: FAMILY MEDICINE

## 2024-10-24 PROCEDURE — 99395 PREV VISIT EST AGE 18-39: CPT | Performed by: FAMILY MEDICINE

## 2024-10-24 PROCEDURE — 84146 ASSAY OF PROLACTIN: CPT

## 2024-10-24 PROCEDURE — 36415 COLL VENOUS BLD VENIPUNCTURE: CPT

## 2024-10-24 PROCEDURE — 1126F AMNT PAIN NOTED NONE PRSNT: CPT | Performed by: FAMILY MEDICINE

## 2024-10-24 PROCEDURE — 80053 COMPREHEN METABOLIC PANEL: CPT

## 2024-10-24 PROCEDURE — 80061 LIPID PANEL: CPT

## 2024-10-24 PROCEDURE — 1159F MED LIST DOCD IN RCRD: CPT | Performed by: FAMILY MEDICINE

## 2024-10-24 PROCEDURE — 84439 ASSAY OF FREE THYROXINE: CPT

## 2024-10-24 PROCEDURE — 2014F MENTAL STATUS ASSESS: CPT | Performed by: FAMILY MEDICINE

## 2024-10-24 PROCEDURE — 85025 COMPLETE CBC W/AUTO DIFF WBC: CPT

## 2024-10-24 PROCEDURE — 84443 ASSAY THYROID STIM HORMONE: CPT

## 2024-10-24 PROCEDURE — 86803 HEPATITIS C AB TEST: CPT

## 2024-10-24 PROCEDURE — 81001 URINALYSIS AUTO W/SCOPE: CPT

## 2024-10-24 RX ORDER — FLUCONAZOLE 150 MG/1
TABLET ORAL
Qty: 1 TABLET | Refills: 0 | Status: SHIPPED | OUTPATIENT
Start: 2024-10-24

## 2024-10-24 RX ORDER — NYSTATIN 100000 [USP'U]/G
POWDER TOPICAL 4 TIMES DAILY
Qty: 60 G | Refills: 1 | Status: SHIPPED | OUTPATIENT
Start: 2024-10-24

## 2024-10-24 RX ORDER — CETIRIZINE HYDROCHLORIDE 10 MG/1
10 TABLET ORAL DAILY
Qty: 30 TABLET | Refills: 5 | Status: SHIPPED | OUTPATIENT
Start: 2024-10-24

## 2024-10-24 RX ORDER — ESCITALOPRAM OXALATE 5 MG/1
5 TABLET ORAL DAILY
Qty: 30 TABLET | Refills: 0 | Status: SHIPPED | OUTPATIENT
Start: 2024-10-24

## 2024-10-24 NOTE — PROGRESS NOTES
"Chief Complaint  Establish Care and Anxiety    Subjective        Lionel Zayas presents to [facility]  History of Present Illness  He is here today to establish relations as a new patient. His last PCP was Saira Blackwell. He has anxiety, depression and gynoplastic. He likes to be called Michele.     He is complaining today of having a sore throat.     He is complaining of feeling anxious and depression.     He is complaining of jock itch today.           Objective   Vital Signs:  /96 (BP Location: Left arm, Patient Position: Sitting, Cuff Size: Adult)   Pulse 79   Temp 97.8 °F (36.6 °C)   Resp 19   Ht 188 cm (74.02\")   Wt 94.7 kg (208 lb 12.8 oz)   SpO2 99%   BMI 26.80 kg/m²   Estimated body mass index is 26.8 kg/m² as calculated from the following:    Height as of this encounter: 188 cm (74.02\").    Weight as of this encounter: 94.7 kg (208 lb 12.8 oz).            Physical Exam  Vitals reviewed.   Constitutional:       Appearance: He is well-developed and overweight.   HENT:      Head: Normocephalic and atraumatic.      Right Ear: External ear normal.      Left Ear: External ear normal.      Mouth/Throat:      Pharynx: No oropharyngeal exudate.      Comments: Cobblestoning appreciated of the mucosa in the oropharynx.  Eyes:      Conjunctiva/sclera: Conjunctivae normal.      Pupils: Pupils are equal, round, and reactive to light.   Neck:      Vascular: No carotid bruit.   Cardiovascular:      Rate and Rhythm: Normal rate and regular rhythm.      Heart sounds: No murmur heard.     No friction rub. No gallop.   Pulmonary:      Effort: Pulmonary effort is normal.      Breath sounds: Normal breath sounds. No wheezing or rhonchi.   Abdominal:      General: There is no distension.   Skin:     General: Skin is warm and dry.   Neurological:      Mental Status: He is alert and oriented to person, place, and time.      Cranial Nerves: No cranial nerve deficit.      Motor: No " weakness.   Psychiatric:         Mood and Affect: Mood and affect normal.         Behavior: Behavior normal.         Thought Content: Thought content normal.         Judgment: Judgment normal.        Result Review :                      Assessment and Plan   Diagnoses and all orders for this visit:    1. Annual physical exam (Primary)  Assessment & Plan:  The patient was encouraged to always wear their seatbelt and never text and drive.  They were encouraged to get 7 to 8 hours sleep at night.  They were encouraged to exercise on a regular basis.  Screening labs were reviewed at today's visit and manage according to findings.      Orders:  -     TSH+Free T4; Future  -     Comprehensive metabolic panel; Future  -     CBC w AUTO Differential; Future    2. Gynecomastia  Assessment & Plan:  His prolactin level was ordered today.    Orders:  -     Prolactin; Future    3. Jock itch  -     nystatin (MYCOSTATIN) 316093 UNIT/GM powder; Apply  topically to the appropriate area as directed 4 (Four) Times a Day.  Dispense: 60 g; Refill: 1  -     fluconazole (DIFLUCAN) 150 MG tablet; Take 1 tablet once, then repeat in 5 days.  Dispense: 1 tablet; Refill: 0    4. Seasonal allergies  Comments:  I believe the patients sore throats symptoms are being caused by seasonal allergies.  Orders:  -     cetirizine (zyrTEC) 10 MG tablet; Take 1 tablet by mouth Daily.  Dispense: 30 tablet; Refill: 5  -     Urinalysis With Microscopic - Urine, Clean Catch; Future  -     Ambulatory Referral to ENT (Otolaryngology)    5. ALLISON (generalized anxiety disorder)  Assessment & Plan:  Psychological condition is worsening.  Medication changes per orders.  Psychological condition  will be reassessed  3 weeks .    Orders:  -     escitalopram (Lexapro) 5 MG tablet; Take 1 tablet by mouth Daily.  Dispense: 30 tablet; Refill: 0    6. Screening for hyperlipidemia  -     Lipid panel; Future    7. Need for hepatitis C screening test  -     Hepatitis C antibody;  Future    8. Sore throat  -     Ambulatory Referral to ENT (Otolaryngology)             Follow Up   Return in about 3 weeks (around 11/14/2024).  Patient was given instructions and counseling regarding his condition or for health maintenance advice. Please see specific information pulled into the AVS if appropriate.

## 2024-10-24 NOTE — ASSESSMENT & PLAN NOTE
Psychological condition is worsening.  Medication changes per orders.  Psychological condition  will be reassessed  3 weeks .

## 2024-11-12 DIAGNOSIS — B35.6 JOCK ITCH: ICD-10-CM

## 2024-11-12 RX ORDER — FLUCONAZOLE 150 MG/1
TABLET ORAL
Qty: 5 TABLET | Refills: 0 | Status: SHIPPED | OUTPATIENT
Start: 2024-11-12

## 2024-11-22 ENCOUNTER — OFFICE VISIT (OUTPATIENT)
Dept: FAMILY MEDICINE CLINIC | Facility: CLINIC | Age: 34
End: 2024-11-22
Payer: COMMERCIAL

## 2024-11-22 VITALS
HEART RATE: 81 BPM | SYSTOLIC BLOOD PRESSURE: 125 MMHG | BODY MASS INDEX: 27.98 KG/M2 | TEMPERATURE: 99.3 F | OXYGEN SATURATION: 99 % | WEIGHT: 218 LBS | HEIGHT: 74 IN | DIASTOLIC BLOOD PRESSURE: 86 MMHG

## 2024-11-22 DIAGNOSIS — K13.29 WHITE PATCHES ON ORAL MUCOSA: ICD-10-CM

## 2024-11-22 DIAGNOSIS — J02.9 SORE THROAT: Primary | ICD-10-CM

## 2024-11-22 DIAGNOSIS — F41.1 GAD (GENERALIZED ANXIETY DISORDER): Chronic | ICD-10-CM

## 2024-11-22 PROBLEM — Z00.00 ANNUAL PHYSICAL EXAM: Status: RESOLVED | Noted: 2023-05-15 | Resolved: 2024-11-22

## 2024-11-22 PROCEDURE — 87205 SMEAR GRAM STAIN: CPT | Performed by: FAMILY MEDICINE

## 2024-11-22 PROCEDURE — 1160F RVW MEDS BY RX/DR IN RCRD: CPT | Performed by: FAMILY MEDICINE

## 2024-11-22 PROCEDURE — 1159F MED LIST DOCD IN RCRD: CPT | Performed by: FAMILY MEDICINE

## 2024-11-22 PROCEDURE — 87070 CULTURE OTHR SPECIMN AEROBIC: CPT | Performed by: FAMILY MEDICINE

## 2024-11-22 PROCEDURE — 1126F AMNT PAIN NOTED NONE PRSNT: CPT | Performed by: FAMILY MEDICINE

## 2024-11-22 PROCEDURE — 99213 OFFICE O/P EST LOW 20 MIN: CPT | Performed by: FAMILY MEDICINE

## 2024-11-22 NOTE — PROGRESS NOTES
"Chief Complaint  Depression (Started lexapro )    Subjective        Lionel Zayas presents to [facility]  History of Present Illness  He is here today for a follow-up for the management of his chronic medical conditions. His last PCP was Saira Blackwell. He has anxiety, depression and gynoplastic. He likes to be called Michele.      He is complaining today of having a sore throat.      He is complaining of feeling anxious and depression.      He is complaining of jock itch today.         Objective   Vital Signs:  /86 (BP Location: Left arm, Patient Position: Sitting, Cuff Size: Adult)   Pulse 81   Temp 99.3 °F (37.4 °C) (Temporal)   Ht 188 cm (74\")   Wt 98.9 kg (218 lb)   SpO2 99%   BMI 27.99 kg/m²   Estimated body mass index is 27.99 kg/m² as calculated from the following:    Height as of this encounter: 188 cm (74\").    Weight as of this encounter: 98.9 kg (218 lb).            Physical Exam  Vitals reviewed.   Constitutional:       Appearance: He is well-developed and overweight.   HENT:      Head: Normocephalic and atraumatic.      Right Ear: External ear normal.      Left Ear: External ear normal.      Mouth/Throat:      Pharynx: No oropharyngeal exudate.   Eyes:      Conjunctiva/sclera: Conjunctivae normal.      Pupils: Pupils are equal, round, and reactive to light.   Neck:      Vascular: No carotid bruit.   Cardiovascular:      Rate and Rhythm: Normal rate and regular rhythm.      Heart sounds: No murmur heard.     No friction rub. No gallop.   Pulmonary:      Effort: Pulmonary effort is normal.      Breath sounds: Normal breath sounds. No wheezing or rhonchi.   Abdominal:      General: There is no distension.   Skin:     General: Skin is warm and dry.   Neurological:      Mental Status: He is alert and oriented to person, place, and time.      Cranial Nerves: No cranial nerve deficit.      Motor: No weakness.   Psychiatric:         Mood and Affect: Mood and " affect normal.         Behavior: Behavior normal.         Thought Content: Thought content normal.         Judgment: Judgment normal.        Result Review :    CMP          10/24/2024    10:26   CMP   Glucose 92    BUN 7    Creatinine 0.96    EGFR 107.0    Sodium 142    Potassium 4.0    Chloride 106    Calcium 9.5    Total Protein 7.3    Albumin 4.5    Globulin 2.8    Total Bilirubin 0.6    Alkaline Phosphatase 106    AST (SGOT) 13    ALT (SGPT) 16    Albumin/Globulin Ratio 1.6    BUN/Creatinine Ratio 7.3    Anion Gap 14.0      CBC          10/24/2024    10:26   CBC   WBC 9.04    RBC 5.70    Hemoglobin 17.1    Hematocrit 50.4    MCV 88.4    MCH 30.0    MCHC 33.9    RDW 11.7    Platelets 295      Lipid Panel          10/24/2024    10:26   Lipid Panel   Total Cholesterol 163    Triglycerides 82    HDL Cholesterol 25    VLDL Cholesterol 16    LDL Cholesterol  122    LDL/HDL Ratio 4.86      TSH          10/24/2024    10:26   TSH   TSH 1.160                Assessment and Plan   Diagnoses and all orders for this visit:    1. Sore throat (Primary)  Assessment & Plan:  He was swabbed today and a culture will be sent off to managed according to findings.     Orders:  -     Wound Culture - Wound, Tonsils; Future  -     Wound Culture - Wound, Tonsils    2. White patches on oral mucosa  -     Wound Culture - Wound, Tonsils; Future  -     Wound Culture - Wound, Tonsils    3. ALLISON (generalized anxiety disorder)  Assessment & Plan:  Psychological condition is improving with treatment.  Continue current treatment regimen.  Psychological condition  will be reassessed in 3 months.               Follow Up   Return in about 5 months (around 4/22/2025).  Patient was given instructions and counseling regarding his condition or for health maintenance advice. Please see specific information pulled into the AVS if appropriate.

## 2024-11-24 LAB
BACTERIA SPEC AEROBE CULT: NORMAL
GRAM STN SPEC: NORMAL
GRAM STN SPEC: NORMAL

## 2025-01-15 DIAGNOSIS — B35.6 JOCK ITCH: ICD-10-CM

## 2025-01-15 RX ORDER — NYSTATIN 100000 [USP'U]/G
POWDER TOPICAL 4 TIMES DAILY
Qty: 60 G | Refills: 1 | Status: SHIPPED | OUTPATIENT
Start: 2025-01-15

## 2025-01-17 DIAGNOSIS — B35.6 JOCK ITCH: ICD-10-CM

## 2025-01-17 RX ORDER — FLUCONAZOLE 150 MG/1
TABLET ORAL
Qty: 5 TABLET | Refills: 0 | Status: SHIPPED | OUTPATIENT
Start: 2025-01-17

## 2025-01-21 NOTE — PROGRESS NOTES
"Chief Complaint   Rectal Bleeding    History of Present Illness       Lionel Zayas is a 35 y.o. male who presents to [facility] GASTROENTEROLOGY for follow-up with new onset of rectal bleeding, personal history of colon polyps, anxiety and reflux.  Patient reports 2 weeks ago that he took a compounded medication from [facility] which included Viagra Cialis and B12 and he began having rectal bleeding that was bright red in color and occurred 2-3 times.  Patient reports blood within the stool as well.  He reports the only other time that this occurred was when he had also taken a variation of Viagra.  Patient reports bowel movements are usually regular occurring 1-2 times usually in the morning.  He denies any abdominal pain, nausea or vomiting.  He reports reflux which is well-maintained with Nexium.  Patient is followed by ENT.  Patient denies fever, nausea, vomiting, weight loss, night sweats, melena, hematemesis.    Most recent labs- 10/24/24    Endoscopy: Review of the patient's most recent EGD and colonoscopy performed by Dr. Hayward on 8/9/2023 normal colonoscopy repeat 5 years for history of colon polyps.  Normal EGD.  Results       Result Review :   The following data was reviewed by: LACHELLE Cross on 01/23/2025:    CMP          10/24/2024    10:26   CMP   Glucose 92    BUN 7    Creatinine 0.96    EGFR 107.0    Sodium 142    Potassium 4.0    Chloride 106    Calcium 9.5    Total Protein 7.3    Albumin 4.5    Globulin 2.8    Total Bilirubin 0.6    Alkaline Phosphatase 106    AST (SGOT) 13    ALT (SGPT) 16    Albumin/Globulin Ratio 1.6    BUN/Creatinine Ratio 7.3    Anion Gap 14.0      CBC          10/24/2024    10:26   CBC   WBC 9.04    RBC 5.70    Hemoglobin 17.1    Hematocrit 50.4    MCV 88.4    MCH 30.0    MCHC 33.9    RDW 11.7    Platelets 295        Iron Profile No results found for: \"IRON\", \"TIBC\", \"LABIRON\", \"TRANSFERRIN\"  Ferritin No results found for: \"FERRITIN\"            Past " Medical History       Past Medical History:   Diagnosis Date    Athlete's foot     Clotting disorder 1/10/25    Colon polyp 2015    One polyp found during colonoscopy in 2015    Dental disease 10/2021    Cavities/ broken tooth    GERD (gastroesophageal reflux disease) 10/2021    Hyperlipidemia 4/4/22    Rectal bleeding 1/23/2025       Past Surgical History:   Procedure Laterality Date    COLONOSCOPY      COLONOSCOPY N/A 08/09/2023    Procedure: COLONOSCOPY;  Surgeon: Milton Hayward MD;  Location: [facility] ENDOSCOPY;  Service: Gastroenterology;  Laterality: N/A;  NORMAL    ENDOSCOPY N/A 08/09/2023    Procedure: ESOPHAGOGASTRODUODENOSCOPY WITH BIOPSIES;  Surgeon: Milton Hayward MD;  Location: [facility] ENDOSCOPY;  Service: Gastroenterology;  Laterality: N/A;  NORMAL    MULTIPLE TOOTH EXTRACTIONS      UPPER GASTROINTESTINAL ENDOSCOPY           Current Outpatient Medications:     esomeprazole (nexIUM) 40 MG capsule, Take 1 capsule by mouth Every Morning Before Breakfast., Disp: , Rfl:     fluconazole (DIFLUCAN) 150 MG tablet, Take 1 tablet daily for 5 days., Disp: 5 tablet, Rfl: 0    nystatin (MYCOSTATIN) 566431 UNIT/GM powder, Apply  topically to the appropriate area as directed 4 (Four) Times a Day., Disp: 60 g, Rfl: 1    cetirizine (zyrTEC) 10 MG tablet, Take 1 tablet by mouth Daily. (Patient not taking: Reported on 1/23/2025), Disp: 30 tablet, Rfl: 5    escitalopram (Lexapro) 5 MG tablet, Take 1 tablet by mouth Daily. (Patient not taking: Reported on 1/23/2025), Disp: 30 tablet, Rfl: 0    Sod Picosulfate-Mag Ox-Cit Acd (Clenpiq) 10-3.5-12 MG-GM -GM/175ML solution, Take 175 mL by mouth 1 (One) Time for 1 dose., Disp: 350 mL, Rfl: 0     No Known Allergies    Family History   Problem Relation Age of Onset    Fibromyalgia Mother     Breast cancer Maternal Great-Grandmother     Colon cancer Neg Hx         Social History     Social History Narrative    Not on file       Objective     Vital Signs:   /93  "(BP Location: Right arm, Patient Position: Sitting, Cuff Size: Adult)   Pulse 64   Ht 188 cm (74\")   Wt 99 kg (218 lb 4.8 oz)   SpO2 99%   BMI 28.03 kg/m²       Physical Exam  Constitutional:       Appearance: Normal appearance.   Pulmonary:      Effort: Pulmonary effort is normal.   Neurological:      General: No focal deficit present.      Mental Status: He is alert and oriented to person, place, and time.   Psychiatric:         Mood and Affect: Mood normal.         Behavior: Behavior normal.           Assessment & Plan          Assessment and Plan    Diagnoses and all orders for this visit:    1. History of colon polyps (Primary)  -     Case Request; Standing  -     Follow Anesthesia Guidelines / Protocol; Standing  -     Follow Anesthesia Guidelines / Protocol; Future  -     Verify NPO; Standing  -     Verify Bowel Prep Was Successful; Standing  -     Give Tap Water Enema If Bowel Prep Insufficient; Standing  -     Case Request    2. Rectal bleeding  -     Case Request; Standing  -     Follow Anesthesia Guidelines / Protocol; Standing  -     Follow Anesthesia Guidelines / Protocol; Future  -     Verify NPO; Standing  -     Verify Bowel Prep Was Successful; Standing  -     Give Tap Water Enema If Bowel Prep Insufficient; Standing  -     Case Request    3. Gastroesophageal reflux disease, unspecified whether esophagitis present  -     Case Request; Standing  -     Follow Anesthesia Guidelines / Protocol; Standing  -     Follow Anesthesia Guidelines / Protocol; Future  -     Verify NPO; Standing  -     Verify Bowel Prep Was Successful; Standing  -     Give Tap Water Enema If Bowel Prep Insufficient; Standing  -     Case Request    Other orders  -     Sod Picosulfate-Mag Ox-Cit Acd (Clenpiq) 10-3.5-12 MG-GM -GM/175ML solution; Take 175 mL by mouth 1 (One) Time for 1 dose.  Dispense: 350 mL; Refill: 0      35-year-old male presenting to the office today for follow-up with new onset of rectal bleeding, personal " history of colon polyps, anxiety and reflux.  With the patient's new onset of rectal bleeding I have recommended that the patient undergo further evaluation with a colonoscopy.  I have discussed this procedure in detail with the patient.  I have discussed the risks, benefits and alternatives.  I have discussed the risk of anesthesia, bleeding and perforation.  Patient understands these risks, benefits and alternatives and wishes to proceed.  I will schedule him at his earliest convenience.  Patient will continue Nexium as prescribed.  We have discussed Carafate in office.  Patient agreeable to this plan and will call with any questions or concerns.             Follow Up       Follow Up   Return for Follow up after endoscopy in office.  Patient was given instructions and counseling regarding his condition or for health maintenance advice. Please see specific information pulled into the AVS if appropriate.

## 2025-01-21 NOTE — H&P (VIEW-ONLY)
"Chief Complaint   Rectal Bleeding    History of Present Illness       Lionel Zayas is a 35 y.o. male who presents to [facility] GASTROENTEROLOGY for follow-up with new onset of rectal bleeding, personal history of colon polyps, anxiety and reflux.  Patient reports 2 weeks ago that he took a compounded medication from [facility] which included Viagra Cialis and B12 and he began having rectal bleeding that was bright red in color and occurred 2-3 times.  Patient reports blood within the stool as well.  He reports the only other time that this occurred was when he had also taken a variation of Viagra.  Patient reports bowel movements are usually regular occurring 1-2 times usually in the morning.  He denies any abdominal pain, nausea or vomiting.  He reports reflux which is well-maintained with Nexium.  Patient is followed by ENT.  Patient denies fever, nausea, vomiting, weight loss, night sweats, melena, hematemesis.    Most recent labs- 10/24/24    Endoscopy: Review of the patient's most recent EGD and colonoscopy performed by Dr. Hayward on 8/9/2023 normal colonoscopy repeat 5 years for history of colon polyps.  Normal EGD.  Results       Result Review :   The following data was reviewed by: LACHELLE Cross on 01/23/2025:    CMP          10/24/2024    10:26   CMP   Glucose 92    BUN 7    Creatinine 0.96    EGFR 107.0    Sodium 142    Potassium 4.0    Chloride 106    Calcium 9.5    Total Protein 7.3    Albumin 4.5    Globulin 2.8    Total Bilirubin 0.6    Alkaline Phosphatase 106    AST (SGOT) 13    ALT (SGPT) 16    Albumin/Globulin Ratio 1.6    BUN/Creatinine Ratio 7.3    Anion Gap 14.0      CBC          10/24/2024    10:26   CBC   WBC 9.04    RBC 5.70    Hemoglobin 17.1    Hematocrit 50.4    MCV 88.4    MCH 30.0    MCHC 33.9    RDW 11.7    Platelets 295        Iron Profile No results found for: \"IRON\", \"TIBC\", \"LABIRON\", \"TRANSFERRIN\"  Ferritin No results found for: \"FERRITIN\"            Past " Medical History       Past Medical History:   Diagnosis Date    Athlete's foot     Clotting disorder 1/10/25    Colon polyp 2015    One polyp found during colonoscopy in 2015    Dental disease 10/2021    Cavities/ broken tooth    GERD (gastroesophageal reflux disease) 10/2021    Hyperlipidemia 4/4/22    Rectal bleeding 1/23/2025       Past Surgical History:   Procedure Laterality Date    COLONOSCOPY      COLONOSCOPY N/A 08/09/2023    Procedure: COLONOSCOPY;  Surgeon: Milton Hayward MD;  Location: [facility] ENDOSCOPY;  Service: Gastroenterology;  Laterality: N/A;  NORMAL    ENDOSCOPY N/A 08/09/2023    Procedure: ESOPHAGOGASTRODUODENOSCOPY WITH BIOPSIES;  Surgeon: Milton Hayward MD;  Location: [facility] ENDOSCOPY;  Service: Gastroenterology;  Laterality: N/A;  NORMAL    MULTIPLE TOOTH EXTRACTIONS      UPPER GASTROINTESTINAL ENDOSCOPY           Current Outpatient Medications:     esomeprazole (nexIUM) 40 MG capsule, Take 1 capsule by mouth Every Morning Before Breakfast., Disp: , Rfl:     fluconazole (DIFLUCAN) 150 MG tablet, Take 1 tablet daily for 5 days., Disp: 5 tablet, Rfl: 0    nystatin (MYCOSTATIN) [id number] UNIT/GM powder, Apply  topically to the appropriate area as directed 4 (Four) Times a Day., Disp: 60 g, Rfl: 1    cetirizine (zyrTEC) 10 MG tablet, Take 1 tablet by mouth Daily. (Patient not taking: Reported on 1/23/2025), Disp: 30 tablet, Rfl: 5    escitalopram (Lexapro) 5 MG tablet, Take 1 tablet by mouth Daily. (Patient not taking: Reported on 1/23/2025), Disp: 30 tablet, Rfl: 0    Sod Picosulfate-Mag Ox-Cit Acd (Clenpiq) 10-3.5-12 MG-GM -GM/175ML solution, Take 175 mL by mouth 1 (One) Time for 1 dose., Disp: 350 mL, Rfl: 0     No Known Allergies    Family History   Problem Relation Age of Onset    Fibromyalgia Mother     Breast cancer Maternal Great-Grandmother     Colon cancer Neg Hx         Social History     Social History Narrative    Not on file       Objective     Vital Signs:   /93  "(BP Location: Right arm, Patient Position: Sitting, Cuff Size: Adult)   Pulse 64   Ht 188 cm (74\")   Wt 99 kg (218 lb 4.8 oz)   SpO2 99%   BMI 28.03 kg/m²       Physical Exam  Constitutional:       Appearance: Normal appearance.   Pulmonary:      Effort: Pulmonary effort is normal.   Neurological:      General: No focal deficit present.      Mental Status: He is alert and oriented to person, place, and time.   Psychiatric:         Mood and Affect: Mood normal.         Behavior: Behavior normal.           Assessment & Plan          Assessment and Plan    Diagnoses and all orders for this visit:    1. History of colon polyps (Primary)  -     Case Request; Standing  -     Follow Anesthesia Guidelines / Protocol; Standing  -     Follow Anesthesia Guidelines / Protocol; Future  -     Verify NPO; Standing  -     Verify Bowel Prep Was Successful; Standing  -     Give Tap Water Enema If Bowel Prep Insufficient; Standing  -     Case Request    2. Rectal bleeding  -     Case Request; Standing  -     Follow Anesthesia Guidelines / Protocol; Standing  -     Follow Anesthesia Guidelines / Protocol; Future  -     Verify NPO; Standing  -     Verify Bowel Prep Was Successful; Standing  -     Give Tap Water Enema If Bowel Prep Insufficient; Standing  -     Case Request    3. Gastroesophageal reflux disease, unspecified whether esophagitis present  -     Case Request; Standing  -     Follow Anesthesia Guidelines / Protocol; Standing  -     Follow Anesthesia Guidelines / Protocol; Future  -     Verify NPO; Standing  -     Verify Bowel Prep Was Successful; Standing  -     Give Tap Water Enema If Bowel Prep Insufficient; Standing  -     Case Request    Other orders  -     Sod Picosulfate-Mag Ox-Cit Acd (Clenpiq) 10-3.5-12 MG-GM -GM/175ML solution; Take 175 mL by mouth 1 (One) Time for 1 dose.  Dispense: 350 mL; Refill: 0      35-year-old male presenting to the office today for follow-up with new onset of rectal bleeding, personal " history of colon polyps, anxiety and reflux.  With the patient's new onset of rectal bleeding I have recommended that the patient undergo further evaluation with a colonoscopy.  I have discussed this procedure in detail with the patient.  I have discussed the risks, benefits and alternatives.  I have discussed the risk of anesthesia, bleeding and perforation.  Patient understands these risks, benefits and alternatives and wishes to proceed.  I will schedule him at his earliest convenience.  Patient will continue Nexium as prescribed.  We have discussed Carafate in office.  Patient agreeable to this plan and will call with any questions or concerns.             Follow Up       Follow Up   Return for Follow up after endoscopy in office.  Patient was given instructions and counseling regarding his condition or for health maintenance advice. Please see specific information pulled into the AVS if appropriate.

## 2025-01-23 ENCOUNTER — OFFICE VISIT (OUTPATIENT)
Dept: GASTROENTEROLOGY | Facility: CLINIC | Age: 35
End: 2025-01-23
Payer: COMMERCIAL

## 2025-01-23 VITALS
DIASTOLIC BLOOD PRESSURE: 93 MMHG | SYSTOLIC BLOOD PRESSURE: 136 MMHG | OXYGEN SATURATION: 99 % | BODY MASS INDEX: 28.02 KG/M2 | WEIGHT: 218.3 LBS | HEART RATE: 64 BPM | HEIGHT: 74 IN

## 2025-01-23 DIAGNOSIS — K21.9 GASTROESOPHAGEAL REFLUX DISEASE, UNSPECIFIED WHETHER ESOPHAGITIS PRESENT: ICD-10-CM

## 2025-01-23 DIAGNOSIS — K62.5 RECTAL BLEEDING: ICD-10-CM

## 2025-01-23 DIAGNOSIS — Z86.0100 HISTORY OF COLON POLYPS: Primary | ICD-10-CM

## 2025-01-23 RX ORDER — ESOMEPRAZOLE MAGNESIUM 40 MG/1
40 CAPSULE, DELAYED RELEASE ORAL
COMMUNITY
Start: 2024-12-30

## 2025-01-23 RX ORDER — SODIUM PICOSULFATE, MAGNESIUM OXIDE, AND ANHYDROUS CITRIC ACID 12; 3.5; 1 G/175ML; G/175ML; MG/175ML
175 LIQUID ORAL ONCE
Qty: 350 ML | Refills: 0 | Status: SHIPPED | OUTPATIENT
Start: 2025-01-23 | End: 2025-01-24

## 2025-02-11 ENCOUNTER — TELEPHONE (OUTPATIENT)
Dept: GASTROENTEROLOGY | Facility: CLINIC | Age: 35
End: 2025-02-11
Payer: COMMERCIAL

## 2025-02-11 NOTE — TELEPHONE ENCOUNTER
Received email from [facility] PAT..  they were not able to reach pt.    Attempted to call pt, vm box is full.      Pt scheduled for c'scope on 2.13.25, 10:00 am arrival time.  Remind of liquid diet the day prior. Remind of bowel prep and instructions.    Will send [employer] message. destin

## 2025-02-11 NOTE — TELEPHONE ENCOUNTER
Received call back from patient-advised call was a reminder in regard to 2/13/25 colonoscopy.   Went over notes in previous encounter-Arrival time, liquid diet & bowel prep.   Patient voiced understanding

## 2025-02-12 ENCOUNTER — ANESTHESIA EVENT (OUTPATIENT)
Dept: GASTROENTEROLOGY | Facility: HOSPITAL | Age: 35
End: 2025-02-12
Payer: COMMERCIAL

## 2025-02-12 RX ORDER — SODIUM CHLORIDE, SODIUM LACTATE, POTASSIUM CHLORIDE, CALCIUM CHLORIDE 600; 310; 30; 20 MG/100ML; MG/100ML; MG/100ML; MG/100ML
30 INJECTION, SOLUTION INTRAVENOUS CONTINUOUS
Status: CANCELLED | OUTPATIENT
Start: 2025-02-12 | End: 2025-02-13

## 2025-02-12 NOTE — ANESTHESIA PREPROCEDURE EVALUATION
Anesthesia Evaluation     Patient summary reviewed   NPO Solid Status: > 8 hours  NPO Liquid Status: > 4 hours           Airway   Mallampati: I  TM distance: >3 FB  Neck ROM: full  No difficulty expected  Dental - normal exam     Pulmonary     breath sounds clear to auscultation  (+) a smoker Current, cigarettes,    ROS comment: Patient's stated he snores at night but has not been formally diagnosed with sleep apnea  Cardiovascular - normal exam  Exercise tolerance: good (4-7 METS)    Rhythm: regular  Rate: normal    (+) hyperlipidemia      Neuro/Psych  (+) psychiatric history Anxiety and Depression  GI/Hepatic/Renal/Endo    (+) obesity, GERD well controlled, GI bleeding (Rectal bleeding) resolved    Musculoskeletal     Abdominal    Substance History   (+) drug use (THC regular use)     OB/GYN          Other        ROS/Med Hx Other: Hx polyps, rectal bleeding     No EKG on file                Anesthesia Plan    ASA 2     general   total IV anesthesia  (Total IV Anesthesia    Patient understands anesthesia not responsible for dental damage.      Discussed risks with pt including aspiration, allergic reactions, apnea, advanced airway placement. Pt verbalized understanding. All questions answered.     )  intravenous induction     Anesthetic plan, risks, benefits, and alternatives have been provided, discussed and informed consent has been obtained with: patient and mother.  Pre-procedure education provided  Plan discussed with CRNA.      CODE STATUS:

## 2025-02-13 ENCOUNTER — ANESTHESIA (OUTPATIENT)
Dept: GASTROENTEROLOGY | Facility: HOSPITAL | Age: 35
End: 2025-02-13
Payer: COMMERCIAL

## 2025-02-13 ENCOUNTER — HOSPITAL ENCOUNTER (OUTPATIENT)
Facility: HOSPITAL | Age: 35
Setting detail: HOSPITAL OUTPATIENT SURGERY
Discharge: HOME | End: 2025-02-13
Attending: INTERNAL MEDICINE | Admitting: INTERNAL MEDICINE
Payer: COMMERCIAL

## 2025-02-13 VITALS
BODY MASS INDEX: 27.39 KG/M2 | WEIGHT: 213.41 LBS | TEMPERATURE: 96.9 F | OXYGEN SATURATION: 98 % | HEART RATE: 71 BPM | DIASTOLIC BLOOD PRESSURE: 86 MMHG | HEIGHT: 74 IN | SYSTOLIC BLOOD PRESSURE: 105 MMHG | RESPIRATION RATE: 13 BRPM

## 2025-02-13 PROCEDURE — 25010000002 LIDOCAINE PF 2% 2 % SOLUTION

## 2025-02-13 PROCEDURE — 25810000003 LACTATED RINGERS PER 1000 ML

## 2025-02-13 PROCEDURE — 25010000002 PROPOFOL 10 MG/ML EMULSION

## 2025-02-13 PROCEDURE — 45378 DIAGNOSTIC COLONOSCOPY: CPT | Performed by: INTERNAL MEDICINE

## 2025-02-13 RX ORDER — SODIUM CHLORIDE, SODIUM LACTATE, POTASSIUM CHLORIDE, CALCIUM CHLORIDE 600; 310; 30; 20 MG/100ML; MG/100ML; MG/100ML; MG/100ML
INJECTION, SOLUTION INTRAVENOUS CONTINUOUS PRN
Status: DISCONTINUED | OUTPATIENT
Start: 2025-02-13 | End: 2025-02-13 | Stop reason: SURG

## 2025-02-13 RX ORDER — PROPOFOL 10 MG/ML
VIAL (ML) INTRAVENOUS AS NEEDED
Status: DISCONTINUED | OUTPATIENT
Start: 2025-02-13 | End: 2025-02-13 | Stop reason: SURG

## 2025-02-13 RX ORDER — LIDOCAINE HYDROCHLORIDE 20 MG/ML
INJECTION, SOLUTION EPIDURAL; INFILTRATION; INTRACAUDAL; PERINEURAL AS NEEDED
Status: DISCONTINUED | OUTPATIENT
Start: 2025-02-13 | End: 2025-02-13 | Stop reason: SURG

## 2025-02-13 RX ADMIN — PROPOFOL 180 MCG/KG/MIN: 10 INJECTION, EMULSION INTRAVENOUS at 11:27

## 2025-02-13 RX ADMIN — PROPOFOL 100 MG: 10 INJECTION, EMULSION INTRAVENOUS at 11:27

## 2025-02-13 RX ADMIN — SODIUM CHLORIDE, POTASSIUM CHLORIDE, SODIUM LACTATE AND CALCIUM CHLORIDE: 600; 310; 30; 20 INJECTION, SOLUTION INTRAVENOUS at 11:25

## 2025-02-13 RX ADMIN — LIDOCAINE HYDROCHLORIDE 100 MG: 20 INJECTION, SOLUTION INTRAVENOUS at 11:27

## 2025-02-13 NOTE — ANESTHESIA POSTPROCEDURE EVALUATION
Patient: Lionel Zayas    Procedure Summary       Date: 02/13/25 Room / Location: [facility] ENDOSCOPY 2 / [facility] ENDOSCOPY    Anesthesia Start: 1125 Anesthesia Stop: 1142    Procedure: COLONOSCOPY Diagnosis:       History of colon polyps      Rectal bleeding      Gastroesophageal reflux disease, unspecified whether esophagitis present      (History of colon polyps [Z86.0100])      (Rectal bleeding [K62.5])      (Gastroesophageal reflux disease, unspecified whether esophagitis present [K21.9])    Surgeons: Milton Hayward MD Provider: Marcio Smith CRNA    Anesthesia Type: general ASA Status: 2            Anesthesia Type: general    Vitals  Vitals Value Taken Time   /92 02/13/25 1201   Temp 36.1 °C (96.9 °F) 02/13/25 1200   Pulse 63 02/13/25 1201   Resp 13 02/13/25 1200   SpO2 98 % 02/13/25 1201   Vitals shown include unfiled device data.        Post Anesthesia Care and Evaluation    Post-procedure mental status: acceptable.  Pain management: satisfactory to patient    Airway patency: patent  Anesthetic complications: No anesthetic complications    Cardiovascular status: acceptable  Respiratory status: acceptable    Comments: Per chart review

## 2025-02-27 ENCOUNTER — PATIENT ROUNDING (BHMG ONLY) (OUTPATIENT)
Dept: URGENT CARE | Facility: CLINIC | Age: 35
End: 2025-02-27
Payer: COMMERCIAL

## 2025-02-27 NOTE — ED NOTES
Thank you for letting us care for you in your recent visit to our urgent care center. We would love to hear about your experience with us. Was this the first time you have visited our location?    We're always looking for ways to make our patients' experiences even better. Do you have any recommendations on ways we may improve?     I appreciate you taking the time to respond. Please be on the lookout for a survey about your recent visit from [employer] via text or email. We would greatly appreciate if you could fill that out and turn it back in. We want your voice to be heard and we value your feedback.   Thank you for choosing [facility] for your healthcare needs.

## 2025-03-10 RX ORDER — FLUCONAZOLE 150 MG/1
150 TABLET ORAL DAILY
Qty: 5 TABLET | Refills: 0 | Status: SHIPPED | OUTPATIENT
Start: 2025-03-10

## 2025-07-03 ENCOUNTER — LAB REQUISITION (OUTPATIENT)
Dept: LAB | Facility: HOSPITAL | Age: 35
End: 2025-07-03
Payer: COMMERCIAL

## 2025-07-03 DIAGNOSIS — J35.01 CHRONIC TONSILLITIS: ICD-10-CM

## 2025-07-03 PROCEDURE — 88304 TISSUE EXAM BY PATHOLOGIST: CPT | Performed by: OTOLARYNGOLOGY

## 2025-07-08 LAB
CYTO UR: NORMAL
LAB AP CASE REPORT: NORMAL
PATH REPORT.FINAL DX SPEC: NORMAL
PATH REPORT.GROSS SPEC: NORMAL

## (undated) DEVICE — Device

## (undated) DEVICE — LINER SURG CANSTR SXN S/RIGD 1500CC

## (undated) DEVICE — BLCK/BITE BLOX WO/DENTL/RIM W/STRAP 54F

## (undated) DEVICE — SINGLE-USE BIOPSY FORCEPS: Brand: RADIAL JAW 4

## (undated) DEVICE — SOLIDIFIER LIQLOC PLS 1500CC BT

## (undated) DEVICE — CONN JET HYDRA H20 AUXILIARY DISP

## (undated) DEVICE — SOL IRRG H2O PL/BG 1000ML STRL

## (undated) DEVICE — Device: Brand: DEFENDO AIR/WATER/SUCTION AND BIOPSY VALVE

## (undated) DEVICE — THE STERILE LIGHT HANDLE COVER IS USED WITH STERIS SURGICAL LIGHTING AND VISUALIZATION SYSTEMS.